# Patient Record
Sex: FEMALE | Race: WHITE | NOT HISPANIC OR LATINO | URBAN - NONMETROPOLITAN AREA
[De-identification: names, ages, dates, MRNs, and addresses within clinical notes are randomized per-mention and may not be internally consistent; named-entity substitution may affect disease eponyms.]

---

## 2023-09-28 ENCOUNTER — TRANSCRIBE ORDERS (OUTPATIENT)
Dept: CARDIOLOGY | Facility: CLINIC | Age: 88
End: 2023-09-28
Payer: MEDICARE

## 2023-09-28 DIAGNOSIS — I48.20 CHRONIC ATRIAL FIBRILLATION (MULTI): ICD-10-CM

## 2023-09-28 DIAGNOSIS — I25.10 ATHEROSCLEROSIS OF NATIVE CORONARY ARTERY, UNSPECIFIED WHETHER ANGINA PRESENT, UNSPECIFIED WHETHER NATIVE OR TRANSPLANTED HEART: ICD-10-CM

## 2023-09-28 DIAGNOSIS — R06.02 SHORTNESS OF BREATH: ICD-10-CM

## 2023-11-10 ENCOUNTER — HOSPITAL ENCOUNTER (OUTPATIENT)
Dept: CARDIOLOGY | Facility: CLINIC | Age: 88
Discharge: HOME | End: 2023-11-10
Payer: MEDICARE

## 2023-11-10 VITALS — HEIGHT: 62 IN

## 2023-11-10 DIAGNOSIS — I48.20 CHRONIC ATRIAL FIBRILLATION (MULTI): ICD-10-CM

## 2023-11-10 DIAGNOSIS — R06.02 SHORTNESS OF BREATH: ICD-10-CM

## 2023-11-10 DIAGNOSIS — I25.111 ATHEROSCLEROTIC HEART DISEASE OF NATIVE CORONARY ARTERY WITH ANGINA PECTORIS WITH DOCUMENTED SPASM (CMS-HCC): ICD-10-CM

## 2023-11-10 DIAGNOSIS — I25.10 ATHEROSCLEROSIS OF NATIVE CORONARY ARTERY, UNSPECIFIED WHETHER ANGINA PRESENT, UNSPECIFIED WHETHER NATIVE OR TRANSPLANTED HEART: ICD-10-CM

## 2023-11-10 LAB
AORTIC VALVE MEAN GRADIENT: 2
AORTIC VALVE PEAK VELOCITY: 0.94
AV PEAK GRADIENT: 3.5
AVA (PEAK VEL): 2.56
AVA (VTI): 2.2
EJECTION FRACTION APICAL 4 CHAMBER: 60.4
LEFT VENTRICLE INTERNAL DIMENSION DIASTOLE: 3.5 (ref 3.5–6)
LEFT VENTRICULAR OUTFLOW TRACT DIAMETER: 2.3
RIGHT VENTRICLE PEAK SYSTOLIC PRESSURE: 27.8

## 2023-11-10 PROCEDURE — 2500000004 HC RX 250 GENERAL PHARMACY W/ HCPCS (ALT 636 FOR OP/ED): Performed by: INTERNAL MEDICINE

## 2023-11-10 PROCEDURE — 93306 TTE W/DOPPLER COMPLETE: CPT

## 2023-11-10 PROCEDURE — 93306 TTE W/DOPPLER COMPLETE: CPT | Performed by: INTERNAL MEDICINE

## 2023-11-10 RX ADMIN — HUMAN ALBUMIN MICROSPHERES AND PERFLUTREN 0.7 ML: 10; .22 INJECTION, SOLUTION INTRAVENOUS at 11:39

## 2024-02-12 DIAGNOSIS — I15.9 SECONDARY HYPERTENSION: ICD-10-CM

## 2024-02-14 PROBLEM — Z86.711 HISTORY OF PULMONARY EMBOLISM: Status: ACTIVE | Noted: 2024-02-14

## 2024-02-14 PROBLEM — I10 HYPERTENSION: Status: ACTIVE | Noted: 2024-02-14

## 2024-02-14 PROBLEM — I48.20 CHRONIC ATRIAL FIBRILLATION (MULTI): Status: ACTIVE | Noted: 2024-02-14

## 2024-02-14 PROBLEM — E78.5 HYPERLIPIDEMIA: Status: ACTIVE | Noted: 2024-02-14

## 2024-02-14 PROBLEM — I26.99 PULMONARY EMBOLISM (MULTI): Status: ACTIVE | Noted: 2024-02-14

## 2024-02-14 PROBLEM — R06.02 SHORTNESS OF BREATH: Status: ACTIVE | Noted: 2024-02-14

## 2024-02-14 PROBLEM — I25.10 SINGLE VESSEL CORONARY ARTERY DISEASE: Status: ACTIVE | Noted: 2024-02-14
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
## 2024-02-14 RX ORDER — ATORVASTATIN CALCIUM 20 MG/1
1 TABLET, FILM COATED ORAL NIGHTLY
COMMUNITY
Start: 2021-05-13 | End: 2024-04-25

## 2024-02-14 RX ORDER — WARFARIN 2 MG/1
2 TABLET ORAL
COMMUNITY
Start: 2021-09-23

## 2024-02-14 RX ORDER — AMLODIPINE BESYLATE 2.5 MG/1
2.5 TABLET ORAL DAILY
Qty: 14 TABLET | Refills: 0 | Status: CANCELLED | OUTPATIENT
Start: 2024-02-14

## 2024-02-14 RX ORDER — MULTIVITAMIN
1 TABLET ORAL DAILY
COMMUNITY
Start: 2005-12-07

## 2024-02-14 RX ORDER — AMLODIPINE BESYLATE 2.5 MG/1
2.5 TABLET ORAL DAILY
COMMUNITY

## 2024-02-14 RX ORDER — FUROSEMIDE 40 MG/1
40 TABLET ORAL DAILY
COMMUNITY
Start: 2023-05-03

## 2024-02-14 RX ORDER — POTASSIUM CHLORIDE 1500 MG/1
20 TABLET, EXTENDED RELEASE ORAL DAILY
COMMUNITY
Start: 2023-10-21

## 2024-02-14 RX ORDER — ISOSORBIDE MONONITRATE 30 MG/1
1 TABLET, EXTENDED RELEASE ORAL DAILY
COMMUNITY
Start: 2021-08-24

## 2024-02-14 RX ORDER — OMEPRAZOLE 20 MG/1
20 CAPSULE, DELAYED RELEASE ORAL
COMMUNITY

## 2024-02-14 RX ORDER — CLONAZEPAM 0.5 MG/1
1 TABLET ORAL NIGHTLY
COMMUNITY
Start: 2020-07-10

## 2024-02-14 RX ORDER — NITROGLYCERIN 0.4 MG/1
TABLET SUBLINGUAL
COMMUNITY

## 2024-02-14 RX ORDER — METOPROLOL TARTRATE 50 MG/1
1 TABLET ORAL 2 TIMES DAILY
COMMUNITY
Start: 2022-02-01 | End: 2024-03-18

## 2024-02-14 RX ORDER — GABAPENTIN 300 MG/1
300 CAPSULE ORAL 4 TIMES DAILY
COMMUNITY
Start: 2021-12-17

## 2024-02-14 RX ORDER — AMLODIPINE BESYLATE 2.5 MG/1
1 TABLET ORAL DAILY
COMMUNITY
Start: 2021-12-03

## 2024-02-14 RX ORDER — ASPIRIN 81 MG/1
1 TABLET ORAL DAILY
COMMUNITY

## 2024-02-14 RX ORDER — LEVETIRACETAM 500 MG/1
1 TABLET ORAL NIGHTLY
COMMUNITY
Start: 2019-04-11

## 2024-02-14 RX ORDER — LOSARTAN POTASSIUM 100 MG/1
100 TABLET ORAL DAILY
COMMUNITY
Start: 2021-08-31

## 2024-02-15 RX ORDER — AMLODIPINE BESYLATE 2.5 MG/1
2.5 TABLET ORAL DAILY
Qty: 90 TABLET | Refills: 3 | Status: SHIPPED | OUTPATIENT
Start: 2024-02-15 | End: 2025-02-14

## 2024-03-16 DIAGNOSIS — I10 PRIMARY HYPERTENSION: ICD-10-CM

## 2024-03-18 RX ORDER — METOPROLOL TARTRATE 50 MG/1
50 TABLET ORAL 2 TIMES DAILY
Qty: 180 TABLET | Refills: 3 | Status: SHIPPED | OUTPATIENT
Start: 2024-03-18

## 2024-04-25 DIAGNOSIS — E78.2 MIXED HYPERLIPIDEMIA: ICD-10-CM

## 2024-04-25 RX ORDER — ATORVASTATIN CALCIUM 20 MG/1
20 TABLET, FILM COATED ORAL NIGHTLY
Qty: 90 TABLET | Refills: 3 | Status: SHIPPED | OUTPATIENT
Start: 2024-04-25

## 2024-06-21 ENCOUNTER — APPOINTMENT (OUTPATIENT)
Dept: CARDIOLOGY | Facility: CLINIC | Age: 89
End: 2024-06-21
Payer: MEDICARE

## 2024-06-21 VITALS — HEART RATE: 62 BPM | SYSTOLIC BLOOD PRESSURE: 96 MMHG | DIASTOLIC BLOOD PRESSURE: 70 MMHG

## 2024-06-21 DIAGNOSIS — I10 PRIMARY HYPERTENSION: ICD-10-CM

## 2024-06-21 DIAGNOSIS — I25.10 SINGLE VESSEL CORONARY ARTERY DISEASE: ICD-10-CM

## 2024-06-21 DIAGNOSIS — E78.2 MIXED HYPERLIPIDEMIA: ICD-10-CM

## 2024-06-21 DIAGNOSIS — I27.82 CHRONIC PULMONARY EMBOLISM WITHOUT ACUTE COR PULMONALE, UNSPECIFIED PULMONARY EMBOLISM TYPE (MULTI): ICD-10-CM

## 2024-06-21 DIAGNOSIS — R06.02 SHORTNESS OF BREATH: ICD-10-CM

## 2024-06-21 DIAGNOSIS — Z86.711 HISTORY OF PULMONARY EMBOLISM: ICD-10-CM

## 2024-06-21 DIAGNOSIS — I48.20 CHRONIC ATRIAL FIBRILLATION (MULTI): Primary | ICD-10-CM

## 2024-06-21 PROCEDURE — 1159F MED LIST DOCD IN RCRD: CPT | Performed by: INTERNAL MEDICINE

## 2024-06-21 PROCEDURE — 99214 OFFICE O/P EST MOD 30 MIN: CPT | Performed by: INTERNAL MEDICINE

## 2024-06-21 PROCEDURE — 1036F TOBACCO NON-USER: CPT | Performed by: INTERNAL MEDICINE

## 2024-06-21 PROCEDURE — 3078F DIAST BP <80 MM HG: CPT | Performed by: INTERNAL MEDICINE

## 2024-06-21 PROCEDURE — 3074F SYST BP LT 130 MM HG: CPT | Performed by: INTERNAL MEDICINE

## 2024-06-21 RX ORDER — AMLODIPINE BESYLATE 5 MG/1
5 TABLET ORAL DAILY
COMMUNITY

## 2024-06-21 RX ORDER — FUROSEMIDE 20 MG/1
20 TABLET ORAL DAILY
COMMUNITY

## 2024-06-21 RX ORDER — METOPROLOL TARTRATE 25 MG/1
25 TABLET, FILM COATED ORAL 2 TIMES DAILY
COMMUNITY

## 2024-06-21 RX ORDER — GABAPENTIN 100 MG/1
100 CAPSULE ORAL 3 TIMES DAILY
COMMUNITY

## 2024-06-21 NOTE — LETTER
June 21, 2024     Ludin Guillen,   [address] [facility]  [place] [address]    Patient: Sosa Marr   YOB: 1926   Date of Visit: 6/21/2024       Dear Dr. Ludin Guillen, DO:    Thank you for referring Sosa Marr to me for evaluation. Below are my notes for this consultation.  If you have questions, please do not hesitate to call me. I look forward to following your patient along with you.       Sincerely,     Rhett Bhakta MD      CC: No Recipients  ______________________________________________________________________________________    Subjective   Sosa Marr is a 89 y.o. female       Chief Complaint    Follow-up          HPI        Patient is here for follow-up to management for chronic permanent atrial fibrillation, long-term anticoagulation, coronary artery disease, hyperlipidemia and obesity.  Since last time I saw her she reports she had a fall resulted in sternal fracture.  She was in the hospital and that and rehab for a few weeks.  She denies complaint of chest pain, palpitation, lightheadedness, dizziness or syncope but admits to very limited exercise tolerance.  She is almost wheelchair-bound.  She is 89 and requesting conservative management only.  Her recent lab and echocardiogram all noted and reviewed with her.    ASSESSMENT:      1. Chronic atrial fibrillation, described on occasion her heart rate is up. She is on anticoagulation  2. Anticoagulation, accomplished by Coumadin due to be an atrial fibrillation previous history of pulmonary emboli.  Tolerating that well with no side effect  3. Coronary artery disease, affecting small diagonal branch. Medical therapy recommended. Rare anginal symptoms reported  4. Hyperlipidemia, controlled  5.  Previous complaint of shortness of breath has improved recent echocardiogram showed normal LV systolic function  6. Obesity with no weight changes.  7. Increased risk of fall with difficulty with  mobility due to advanced age.  She had a recent fall and sternal fracture  8. Probable sleep apnea     RECOMMENDATION:      1. I advised the patient to continue present medical therapy  2. We will see her back in the office in 9-month   3. The rationale behind anticoagulation reviewed with her at length. She understood and agreed.  4. Fall precaution education  5. Treatment option reviewed with patient and her daughter and they both elected and requested medical management only and   6. We discussed the possibility of sleep apnea and the family has no interest in pursuing any work-up at the moment  7.  I reviewed her recent lab work and echocardiogram  Review of Systems   All other systems reviewed and are negative.           Vitals:    06/21/24 1028   BP: 96/70   BP Location: Left arm   Patient Position: Sitting   Pulse: 62        Objective   Physical Exam  Constitutional:       Appearance: Normal appearance.   HENT:      Nose: Nose normal.   Neck:      Vascular: No carotid bruit.   Cardiovascular:      Rate and Rhythm: Normal rate. Rhythm regularly irregular.      Pulses: Normal pulses.      Heart sounds: Normal heart sounds.   Pulmonary:      Effort: Pulmonary effort is normal.   Abdominal:      General: Bowel sounds are normal.      Palpations: Abdomen is soft.   Musculoskeletal:         General: Normal range of motion.      Cervical back: Normal range of motion.      Right lower leg: No edema.      Left lower leg: No edema.   Skin:     General: Skin is warm and dry.   Neurological:      General: No focal deficit present.      Mental Status: She is alert.   Psychiatric:         Mood and Affect: Mood normal.         Behavior: Behavior normal.         Thought Content: Thought content normal.         Judgment: Judgment normal.         Allergies  Clindamycin, Penicillins, and Sulfamethoxazole-trimethoprim     Current Medications    Current Outpatient Medications:   •  amLODIPine (Norvasc) 5 mg tablet, Take 1 tablet  (5 mg) by mouth once daily., Disp: , Rfl:   •  aspirin 81 mg EC tablet, Take 1 tablet (81 mg) by mouth once daily., Disp: , Rfl:   •  furosemide (Lasix) 20 mg tablet, Take 1 tablet (20 mg) by mouth once daily., Disp: , Rfl:   •  gabapentin (Neurontin) 100 mg capsule, Take 1 capsule (100 mg) by mouth 3 times a day., Disp: , Rfl:   •  isosorbide mononitrate ER (Imdur) 30 mg 24 hr tablet, Take 1 tablet (30 mg) by mouth once daily., Disp: , Rfl:   •  levETIRAcetam (Keppra) 500 mg tablet, Take 1 tablet (500 mg) by mouth once daily at bedtime., Disp: , Rfl:   •  losartan (Cozaar) 100 mg tablet, 1 tablet (100 mg) once daily., Disp: , Rfl:   •  metoprolol tartrate (Lopressor) 25 mg tablet, Take 1 tablet (25 mg) by mouth 2 times a day., Disp: , Rfl:   •  multivitamin tablet, Take 1 tablet by mouth once daily., Disp: , Rfl:   •  omeprazole (PriLOSEC) 20 mg DR capsule, Take 1 capsule (20 mg) by mouth once daily in the morning. Take before meals., Disp: , Rfl:   •  warfarin (Coumadin) 2 mg tablet, 1 tablet (2 mg). Take as directed by Dr. Guillen, Disp: , Rfl:                      Assessment/Plan   1. Chronic atrial fibrillation (Multi)  Follow Up In Cardiology      2. Mixed hyperlipidemia        3. Primary hypertension        4. Single vessel coronary artery disease        5. History of pulmonary embolism        6. Chronic pulmonary embolism without acute cor pulmonale, unspecified pulmonary embolism type (Multi)        7. Shortness of breath                 Scribe Attestation  By signing my name below, Eugenia FRANCIS LPN, Scribe   attest that this documentation has been prepared under the direction and in the presence of Rhett Bhakta MD.     Provider Attestation - Scribe documentation    All medical record entries made by the Scribe were at my direction and personally dictated by me. I have reviewed the chart and agree that the record accurately reflects my personal performance of the history, physical exam, discussion  and plan.

## 2024-06-21 NOTE — PROGRESS NOTES
Subjective   Sosa Marr is a 89 y.o. female       Chief Complaint    Follow-up          HPI        Patient is here for follow-up to management for chronic permanent atrial fibrillation, long-term anticoagulation, coronary artery disease, hyperlipidemia and obesity.  Since last time I saw her she reports she had a fall resulted in sternal fracture.  She was in the hospital and that and rehab for a few weeks.  She denies complaint of chest pain, palpitation, lightheadedness, dizziness or syncope but admits to very limited exercise tolerance.  She is almost wheelchair-bound.  She is 89 and requesting conservative management only.  Her recent lab and echocardiogram all noted and reviewed with her.    ASSESSMENT:      1. Chronic atrial fibrillation, described on occasion her heart rate is up. She is on anticoagulation  2. Anticoagulation, accomplished by Coumadin due to be an atrial fibrillation previous history of pulmonary emboli.  Tolerating that well with no side effect  3. Coronary artery disease, affecting small diagonal branch. Medical therapy recommended. Rare anginal symptoms reported  4. Hyperlipidemia, controlled  5.  Previous complaint of shortness of breath has improved recent echocardiogram showed normal LV systolic function  6. Obesity with no weight changes.  7. Increased risk of fall with difficulty with mobility due to advanced age.  She had a recent fall and sternal fracture  8. Probable sleep apnea     RECOMMENDATION:      1. I advised the patient to continue present medical therapy  2. We will see her back in the office in 9-month   3. The rationale behind anticoagulation reviewed with her at length. She understood and agreed.  4. Fall precaution education  5. Treatment option reviewed with patient and her daughter and they both elected and requested medical management only and   6. We discussed the possibility of sleep apnea and the family has no interest in pursuing any work-up at the moment  7.   I reviewed her recent lab work and echocardiogram  Review of Systems   All other systems reviewed and are negative.           Vitals:    06/21/24 1028   BP: 96/70   BP Location: Left arm   Patient Position: Sitting   Pulse: 62        Objective   Physical Exam  Constitutional:       Appearance: Normal appearance.   HENT:      Nose: Nose normal.   Neck:      Vascular: No carotid bruit.   Cardiovascular:      Rate and Rhythm: Normal rate. Rhythm regularly irregular.      Pulses: Normal pulses.      Heart sounds: Normal heart sounds.   Pulmonary:      Effort: Pulmonary effort is normal.   Abdominal:      General: Bowel sounds are normal.      Palpations: Abdomen is soft.   Musculoskeletal:         General: Normal range of motion.      Cervical back: Normal range of motion.      Right lower leg: No edema.      Left lower leg: No edema.   Skin:     General: Skin is warm and dry.   Neurological:      General: No focal deficit present.      Mental Status: She is alert.   Psychiatric:         Mood and Affect: Mood normal.         Behavior: Behavior normal.         Thought Content: Thought content normal.         Judgment: Judgment normal.         Allergies  Clindamycin, Penicillins, and Sulfamethoxazole-trimethoprim     Current Medications    Current Outpatient Medications:     amLODIPine (Norvasc) 5 mg tablet, Take 1 tablet (5 mg) by mouth once daily., Disp: , Rfl:     aspirin 81 mg EC tablet, Take 1 tablet (81 mg) by mouth once daily., Disp: , Rfl:     furosemide (Lasix) 20 mg tablet, Take 1 tablet (20 mg) by mouth once daily., Disp: , Rfl:     gabapentin (Neurontin) 100 mg capsule, Take 1 capsule (100 mg) by mouth 3 times a day., Disp: , Rfl:     isosorbide mononitrate ER (Imdur) 30 mg 24 hr tablet, Take 1 tablet (30 mg) by mouth once daily., Disp: , Rfl:     levETIRAcetam (Keppra) 500 mg tablet, Take 1 tablet (500 mg) by mouth once daily at bedtime., Disp: , Rfl:     losartan (Cozaar) 100 mg tablet, 1 tablet (100 mg)  once daily., Disp: , Rfl:     metoprolol tartrate (Lopressor) 25 mg tablet, Take 1 tablet (25 mg) by mouth 2 times a day., Disp: , Rfl:     multivitamin tablet, Take 1 tablet by mouth once daily., Disp: , Rfl:     omeprazole (PriLOSEC) 20 mg DR capsule, Take 1 capsule (20 mg) by mouth once daily in the morning. Take before meals., Disp: , Rfl:     warfarin (Coumadin) 2 mg tablet, 1 tablet (2 mg). Take as directed by Dr. Guillen, Disp: , Rfl:                      Assessment/Plan   1. Chronic atrial fibrillation (Multi)  Follow Up In Cardiology      2. Mixed hyperlipidemia        3. Primary hypertension        4. Single vessel coronary artery disease        5. History of pulmonary embolism        6. Chronic pulmonary embolism without acute cor pulmonale, unspecified pulmonary embolism type (Multi)        7. Shortness of breath                 Scribe Attestation  By signing my name below, Eugenia FRANCIS LPN, Scribe   attest that this documentation has been prepared under the direction and in the presence of Rhett Bhakta MD.     Provider Attestation - Scribe documentation    All medical record entries made by the Scribe were at my direction and personally dictated by me. I have reviewed the chart and agree that the record accurately reflects my personal performance of the history, physical exam, discussion and plan.

## 2024-07-14 DIAGNOSIS — I10 HYPERTENSION, UNSPECIFIED TYPE: ICD-10-CM

## 2024-07-15 RX ORDER — ISOSORBIDE MONONITRATE 30 MG/1
30 TABLET, EXTENDED RELEASE ORAL DAILY
Qty: 90 TABLET | Refills: 3 | Status: SHIPPED | OUTPATIENT
Start: 2024-07-15 | End: 2025-07-15

## 2025-03-21 ENCOUNTER — APPOINTMENT (OUTPATIENT)
Dept: CARDIOLOGY | Facility: CLINIC | Age: OVER 89
End: 2025-03-21
Payer: MEDICARE

## 2025-03-21 VITALS — SYSTOLIC BLOOD PRESSURE: 128 MMHG | HEART RATE: 57 BPM | DIASTOLIC BLOOD PRESSURE: 62 MMHG

## 2025-03-21 DIAGNOSIS — I25.10 SINGLE VESSEL CORONARY ARTERY DISEASE: ICD-10-CM

## 2025-03-21 DIAGNOSIS — I48.20 CHRONIC ATRIAL FIBRILLATION (MULTI): Primary | ICD-10-CM
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
## 2025-03-21 DIAGNOSIS — Z86.711 HISTORY OF PULMONARY EMBOLISM: ICD-10-CM

## 2025-03-21 DIAGNOSIS — I10 PRIMARY HYPERTENSION: ICD-10-CM

## 2025-03-21 DIAGNOSIS — E78.2 MIXED HYPERLIPIDEMIA: ICD-10-CM

## 2025-03-21 DIAGNOSIS — R06.02 SHORTNESS OF BREATH: ICD-10-CM

## 2025-03-21 DIAGNOSIS — I27.82 CHRONIC PULMONARY EMBOLISM WITHOUT ACUTE COR PULMONALE, UNSPECIFIED PULMONARY EMBOLISM TYPE (MULTI): ICD-10-CM

## 2025-03-21 RX ORDER — GABAPENTIN 100 MG/1
100 CAPSULE ORAL
COMMUNITY

## 2025-03-21 ASSESSMENT — ENCOUNTER SYMPTOMS: SHORTNESS OF BREATH: 1

## 2025-03-21 NOTE — PROGRESS NOTES
Chief Complaint   Patient presents with    Follow-up     9 months Follow up for Atrial Fibrillation         Subjective   Sosa Marr is a 89 y.o. female     HPI        Patient is here for follow-up continue management for chronic permanent atrial fibrillation, long-term anticoagulation, coronary heart disease and hyperlipidemia.  She is 89 but overall seems to be doing quite well.  She has not had any complaint of chest pain, palpitation, lightheadedness, dizziness or syncope.  Her heart rate is well-controlled.  No recent lab available.    ASSESSMENT:      1. Chronic atrial fibrillation, described on occasion her heart rate is up. She is on anticoagulation.  Well-tolerated  2. Anticoagulation, accomplished by Coumadin due to be an atrial fibrillation previous history of pulmonary emboli.  Tolerating that well with no side effect  3. Coronary artery disease, affecting small diagonal branch. Medical therapy recommended. Rare anginal symptoms reported  4. Hyperlipidemia, no recent lab  5.  Previous complaint of shortness of breath has improved recent echocardiogram showed normal LV systolic function  6. Obesity with no weight changes.  7. Increased risk of fall with difficulty with mobility due to advanced age.  She had a recent fall and sternal fracture  8. Probable sleep apnea     RECOMMENDATION:      1. I advised the patient to continue present medical therapy  2. We will see her back in the office in 1 year  3. The rationale behind anticoagulation reviewed with her at length. She understood and agreed.  4. Fall precaution education  5. Treatment option reviewed with patient and her daughter and they both elected and requested medical management only and   6. We discussed the possibility of sleep apnea and the family has no interest in pursuing any work-up at the moment  7.  Will repeat routine lab work  Review of Systems   Respiratory:  Positive for shortness of breath.    All other systems reviewed and are  negative.           Vitals:    03/21/25 1124   BP: 128/62   BP Location: Left arm   Patient Position: Sitting   Pulse: 57      EKG done in office today      Objective   Physical Exam  Constitutional:       Appearance: Normal appearance.   HENT:      Nose: Nose normal.   Neck:      Vascular: No carotid bruit.   Cardiovascular:      Rate and Rhythm: Normal rate. Rhythm irregularly irregular.      Pulses: Normal pulses.      Heart sounds: Murmur heard.      Systolic murmur is present with a grade of 1/6.   Pulmonary:      Effort: Pulmonary effort is normal.   Abdominal:      General: Bowel sounds are normal.      Palpations: Abdomen is soft.   Musculoskeletal:         General: Normal range of motion.      Cervical back: Normal range of motion.      Right lower leg: No edema.      Left lower leg: No edema.   Skin:     General: Skin is warm and dry.   Neurological:      General: No focal deficit present.      Mental Status: She is alert.   Psychiatric:         Mood and Affect: Mood normal.         Behavior: Behavior normal.         Thought Content: Thought content normal.         Judgment: Judgment normal.         Allergies  Clindamycin, Penicillins, and Sulfamethoxazole-trimethoprim     Current Medications    Current Outpatient Medications:     amLODIPine (Norvasc) 5 mg tablet, Take 1 tablet (5 mg) by mouth once daily., Disp: , Rfl:     aspirin 81 mg EC tablet, Take 1 tablet (81 mg) by mouth once daily., Disp: , Rfl:     furosemide (Lasix) 20 mg tablet, Take 1 tablet (20 mg) by mouth once daily., Disp: , Rfl:     gabapentin (Neurontin) 100 mg capsule, Take 1 capsule (100 mg) by mouth 5 times a day., Disp: , Rfl:     isosorbide mononitrate ER (Imdur) 30 mg 24 hr tablet, Take 1 tablet (30 mg) by mouth once daily., Disp: 90 tablet, Rfl: 3    levETIRAcetam (Keppra) 500 mg tablet, Take 1 tablet (500 mg) by mouth once daily at bedtime., Disp: , Rfl:     losartan (Cozaar) 100 mg tablet, 1 tablet (100 mg) once daily., Disp: ,  Rfl:     metoprolol tartrate (Lopressor) 25 mg tablet, Take 1 tablet (25 mg) by mouth 2 times a day., Disp: , Rfl:     omeprazole (PriLOSEC) 20 mg DR capsule, Take 1 capsule (20 mg) by mouth once daily in the morning. Take before meals., Disp: , Rfl:     warfarin (Coumadin) 2 mg tablet, 1 tablet (2 mg). Take as directed by Dr. Guillen, Disp: , Rfl:                      Assessment/Plan   1. Chronic atrial fibrillation (Multi)  Follow Up In Cardiology    ECG 12 Lead    Follow Up In Cardiology      2. Single vessel coronary artery disease  Basic Metabolic Panel    CBC    Basic Metabolic Panel    CBC      3. Primary hypertension  Basic Metabolic Panel    Basic Metabolic Panel      4. Mixed hyperlipidemia        5. Chronic pulmonary embolism without acute cor pulmonale, unspecified pulmonary embolism type (Multi)        6. History of pulmonary embolism        7. Fall, sequela        8. Shortness of breath                 Scribe Attestation  By signing my name below, INorma RN   , Scribe   attest that this documentation has been prepared under the direction and in the presence of Rhett Bhakta MD.     Provider Attestation - Scribe documentation    All medical record entries made by the Scribe were at my direction and personally dictated by me. I have reviewed the chart and agree that the record accurately reflects my personal performance of the history, physical exam, discussion and plan.

## 2025-03-21 NOTE — LETTER
March 21, 2025     Ludin Guillen,   [address] [facility] [facility] [address]    Patient: Sosa Marr   YOB: 1926   Date of Visit: 3/21/2025       Dear Dr. Ludin Guillen, DO:    Thank you for referring Sosa Marr to me for evaluation. Below are my notes for this consultation.  If you have questions, please do not hesitate to call me. I look forward to following your patient along with you.       Sincerely,     Rhett Bhakta MD      CC: No Recipients  ______________________________________________________________________________________    Chief Complaint   Patient presents with   • Follow-up     9 months Follow up for Atrial Fibrillation         Subjective   Sosa Marr is a 89 y.o. female     HPI        Patient is here for follow-up continue management for chronic permanent atrial fibrillation, long-term anticoagulation, coronary heart disease and hyperlipidemia.  She is 89 but overall seems to be doing quite well.  She has not had any complaint of chest pain, palpitation, lightheadedness, dizziness or syncope.  Her heart rate is well-controlled.  No recent lab available.    ASSESSMENT:      1. Chronic atrial fibrillation, described on occasion her heart rate is up. She is on anticoagulation.  Well-tolerated  2. Anticoagulation, accomplished by Coumadin due to be an atrial fibrillation previous history of pulmonary emboli.  Tolerating that well with no side effect  3. Coronary artery disease, affecting small diagonal branch. Medical therapy recommended. Rare anginal symptoms reported  4. Hyperlipidemia, no recent lab  5.  Previous complaint of shortness of breath has improved recent echocardiogram showed normal LV systolic function  6. Obesity with no weight changes.  7. Increased risk of fall with difficulty with mobility due to advanced age.  She had a recent fall and sternal fracture  8. Probable sleep apnea     RECOMMENDATION:      1. I advised the  patient to continue present medical therapy  2. We will see her back in the office in 1 year  3. The rationale behind anticoagulation reviewed with her at length. She understood and agreed.  4. Fall precaution education  5. Treatment option reviewed with patient and her daughter and they both elected and requested medical management only and   6. We discussed the possibility of sleep apnea and the family has no interest in pursuing any work-up at the moment  7.  Will repeat routine lab work  Review of Systems   Respiratory:  Positive for shortness of breath.    All other systems reviewed and are negative.           Vitals:    03/21/25 1124   BP: 128/62   BP Location: Left arm   Patient Position: Sitting   Pulse: 57      EKG done in office today      Objective   Physical Exam  Constitutional:       Appearance: Normal appearance.   HENT:      Nose: Nose normal.   Neck:      Vascular: No carotid bruit.   Cardiovascular:      Rate and Rhythm: Normal rate. Rhythm irregularly irregular.      Pulses: Normal pulses.      Heart sounds: Murmur heard.      Systolic murmur is present with a grade of 1/6.   Pulmonary:      Effort: Pulmonary effort is normal.   Abdominal:      General: Bowel sounds are normal.      Palpations: Abdomen is soft.   Musculoskeletal:         General: Normal range of motion.      Cervical back: Normal range of motion.      Right lower leg: No edema.      Left lower leg: No edema.   Skin:     General: Skin is warm and dry.   Neurological:      General: No focal deficit present.      Mental Status: She is alert.   Psychiatric:         Mood and Affect: Mood normal.         Behavior: Behavior normal.         Thought Content: Thought content normal.         Judgment: Judgment normal.         Allergies  Clindamycin, Penicillins, and Sulfamethoxazole-trimethoprim     Current Medications    Current Outpatient Medications:   •  amLODIPine (Norvasc) 5 mg tablet, Take 1 tablet (5 mg) by mouth once daily., Disp: ,  Rfl:   •  aspirin 81 mg EC tablet, Take 1 tablet (81 mg) by mouth once daily., Disp: , Rfl:   •  furosemide (Lasix) 20 mg tablet, Take 1 tablet (20 mg) by mouth once daily., Disp: , Rfl:   •  gabapentin (Neurontin) 100 mg capsule, Take 1 capsule (100 mg) by mouth 5 times a day., Disp: , Rfl:   •  isosorbide mononitrate ER (Imdur) 30 mg 24 hr tablet, Take 1 tablet (30 mg) by mouth once daily., Disp: 90 tablet, Rfl: 3  •  levETIRAcetam (Keppra) 500 mg tablet, Take 1 tablet (500 mg) by mouth once daily at bedtime., Disp: , Rfl:   •  losartan (Cozaar) 100 mg tablet, 1 tablet (100 mg) once daily., Disp: , Rfl:   •  metoprolol tartrate (Lopressor) 25 mg tablet, Take 1 tablet (25 mg) by mouth 2 times a day., Disp: , Rfl:   •  omeprazole (PriLOSEC) 20 mg DR capsule, Take 1 capsule (20 mg) by mouth once daily in the morning. Take before meals., Disp: , Rfl:   •  warfarin (Coumadin) 2 mg tablet, 1 tablet (2 mg). Take as directed by Dr. Guillen, Disp: , Rfl:                      Assessment/Plan   1. Chronic atrial fibrillation (Multi)  Follow Up In Cardiology    ECG 12 Lead    Follow Up In Cardiology      2. Single vessel coronary artery disease  Basic Metabolic Panel    CBC    Basic Metabolic Panel    CBC      3. Primary hypertension  Basic Metabolic Panel    Basic Metabolic Panel      4. Mixed hyperlipidemia        5. Chronic pulmonary embolism without acute cor pulmonale, unspecified pulmonary embolism type (Multi)        6. History of pulmonary embolism        7. Fall, sequela        8. Shortness of breath                 Scribe Attestation  By signing my name below, I, Norma FINE RN   , Scribe   attest that this documentation has been prepared under the direction and in the presence of Rhett Bhakta MD.     Provider Attestation - Scribe documentation    All medical record entries made by the Scribe were at my direction and personally dictated by me. I have reviewed the chart and agree that the record accurately  reflects my personal performance of the history, physical exam, discussion and plan.

## 2025-05-28 DIAGNOSIS — I10 HYPERTENSION, UNSPECIFIED TYPE: ICD-10-CM

## 2025-05-29 RX ORDER — ISOSORBIDE MONONITRATE 30 MG/1
30 TABLET, EXTENDED RELEASE ORAL DAILY
Qty: 90 TABLET | Refills: 3 | Status: SHIPPED | OUTPATIENT
Start: 2025-05-29

## 2025-08-29 DIAGNOSIS — I10 PRIMARY HYPERTENSION: ICD-10-CM

## 2025-08-29 DIAGNOSIS — I48.20 CHRONIC ATRIAL FIBRILLATION (MULTI): ICD-10-CM

## 2025-08-29 RX ORDER — METOPROLOL TARTRATE 25 MG/1
25 TABLET, FILM COATED ORAL 2 TIMES DAILY
Qty: 180 TABLET | Refills: 3 | Status: SHIPPED | OUTPATIENT
Start: 2025-08-29 | End: 2026-08-29

## 2026-03-27 ENCOUNTER — APPOINTMENT (OUTPATIENT)
Dept: CARDIOLOGY | Facility: CLINIC | Age: OVER 89
End: 2026-03-27
Payer: MEDICARE